# Patient Record
Sex: MALE | Race: WHITE | NOT HISPANIC OR LATINO | Employment: OTHER | URBAN - METROPOLITAN AREA
[De-identification: names, ages, dates, MRNs, and addresses within clinical notes are randomized per-mention and may not be internally consistent; named-entity substitution may affect disease eponyms.]

---

## 2023-07-24 ENCOUNTER — NURSING HOME VISIT (OUTPATIENT)
Dept: POST ACUTE CARE | Facility: EXTERNAL LOCATION | Age: 78
End: 2023-07-24
Payer: MEDICARE

## 2023-07-24 DIAGNOSIS — R53.1 WEAKNESS: Primary | ICD-10-CM

## 2023-07-24 DIAGNOSIS — I10 PRIMARY HYPERTENSION: ICD-10-CM

## 2023-07-24 PROCEDURE — 99304 1ST NF CARE SF/LOW MDM 25: CPT | Performed by: INTERNAL MEDICINE

## 2023-07-24 NOTE — LETTER
Patient: Donn Hough  : 1945    Encounter Date: 2023    Pt was seen in the NH, 77 yo male with PMH   General appearance: Comfortable, no distress  ROS: No SOB  Medications reviewed  Head: Normal  Neck: Soft  Heart: Regular  Lungs: Clear  Abdomen: soft   Back surgical area healing nicely      Impression: Rehab supportive care, continue current management    Problem List Items Addressed This Visit    None  Visit Diagnoses       Weakness    -  Primary    Primary hypertension                   Electronically Signed By: Romulo Pulido MD   23  9:15 PM

## 2023-07-25 NOTE — PROGRESS NOTES
Pt was seen in the NH, 79 yo male with PMH   General appearance: Comfortable, no distress  ROS: No SOB  Medications reviewed  Head: Normal  Neck: Soft  Heart: Regular  Lungs: Clear  Abdomen: soft   Back surgical area healing nicely      Impression: Rehab supportive care, continue current management    Problem List Items Addressed This Visit    None  Visit Diagnoses       Weakness    -  Primary    Primary hypertension

## 2023-08-03 ENCOUNTER — OFFICE VISIT (OUTPATIENT)
Dept: PRIMARY CARE | Facility: CLINIC | Age: 78
End: 2023-08-03
Payer: MEDICARE

## 2023-08-03 VITALS — SYSTOLIC BLOOD PRESSURE: 132 MMHG | HEART RATE: 92 BPM | DIASTOLIC BLOOD PRESSURE: 74 MMHG

## 2023-08-03 DIAGNOSIS — M17.12 PRIMARY OSTEOARTHRITIS OF LEFT KNEE: Primary | ICD-10-CM

## 2023-08-03 PROBLEM — M19.90 ARTHRITIS: Status: ACTIVE | Noted: 2019-05-02

## 2023-08-03 PROBLEM — M10.00 ACUTE IDIOPATHIC GOUT: Status: ACTIVE | Noted: 2023-08-03

## 2023-08-03 PROBLEM — N40.1 BENIGN PROSTATIC HYPERPLASIA WITH URINARY RETENTION: Status: ACTIVE | Noted: 2019-05-02

## 2023-08-03 PROBLEM — N18.30 CONTROLLED TYPE 2 DIABETES MELLITUS WITH STAGE 3 CHRONIC KIDNEY DISEASE, WITHOUT LONG-TERM CURRENT USE OF INSULIN (MULTI): Status: ACTIVE | Noted: 2023-08-03

## 2023-08-03 PROBLEM — G62.9 NEUROPATHY: Status: ACTIVE | Noted: 2023-08-03

## 2023-08-03 PROBLEM — R20.0 NUMBNESS AND TINGLING IN RIGHT HAND: Status: ACTIVE | Noted: 2018-11-16

## 2023-08-03 PROBLEM — M51.36 DDD (DEGENERATIVE DISC DISEASE), LUMBAR: Status: ACTIVE | Noted: 2023-07-07

## 2023-08-03 PROBLEM — R07.9 CHEST PAIN: Status: RESOLVED | Noted: 2019-11-06 | Resolved: 2023-08-03

## 2023-08-03 PROBLEM — E66.9 OBESITY (BMI 30.0-34.9): Status: ACTIVE | Noted: 2019-05-02

## 2023-08-03 PROBLEM — I10 HTN (HYPERTENSION), BENIGN: Status: ACTIVE | Noted: 2019-04-21

## 2023-08-03 PROBLEM — D64.9 ANEMIA: Status: ACTIVE | Noted: 2022-08-19

## 2023-08-03 PROBLEM — Z98.890 POST-OPERATIVE STATE: Status: RESOLVED | Noted: 2023-07-06 | Resolved: 2023-08-03

## 2023-08-03 PROBLEM — Z95.1 HX OF CABG: Status: ACTIVE | Noted: 2021-07-28

## 2023-08-03 PROBLEM — E03.9 ACQUIRED HYPOTHYROIDISM: Status: ACTIVE | Noted: 2019-05-02

## 2023-08-03 PROBLEM — A08.4 VIRAL DIARRHEA: Status: RESOLVED | Noted: 2023-06-19 | Resolved: 2023-08-03

## 2023-08-03 PROBLEM — M79.662 PAIN OF LEFT CALF: Status: ACTIVE | Noted: 2023-07-10

## 2023-08-03 PROBLEM — M54.50 LOW BACK PAIN: Status: RESOLVED | Noted: 2022-04-27 | Resolved: 2023-08-03

## 2023-08-03 PROBLEM — M43.26 FUSION OF LUMBAR SPINE: Status: ACTIVE | Noted: 2023-07-09

## 2023-08-03 PROBLEM — M25.569 PAIN IN JOINT, LOWER LEG: Status: ACTIVE | Noted: 2023-06-21

## 2023-08-03 PROBLEM — M50.90 CERVICAL DISC DISORDER: Status: ACTIVE | Noted: 2019-05-02

## 2023-08-03 PROBLEM — N17.9 ARF (ACUTE RENAL FAILURE) (CMS-HCC): Status: ACTIVE | Noted: 2023-05-06

## 2023-08-03 PROBLEM — M54.6 THORACIC SPINE PAIN: Status: RESOLVED | Noted: 2022-04-27 | Resolved: 2023-08-03

## 2023-08-03 PROBLEM — R55 NEAR SYNCOPE: Status: RESOLVED | Noted: 2023-02-01 | Resolved: 2023-08-03

## 2023-08-03 PROBLEM — G47.33 OBSTRUCTIVE SLEEP APNEA SYNDROME: Chronic | Status: ACTIVE | Noted: 2019-05-02

## 2023-08-03 PROBLEM — R26.89 POOR BALANCE: Status: ACTIVE | Noted: 2022-04-27

## 2023-08-03 PROBLEM — Z98.890 H/O CERVICAL SPINE SURGERY: Status: RESOLVED | Noted: 2018-11-16 | Resolved: 2023-08-03

## 2023-08-03 PROBLEM — E78.5 HYPERLIPIDEMIA: Chronic | Status: ACTIVE | Noted: 2023-06-19

## 2023-08-03 PROBLEM — I25.10 CORONARY ARTERY DISEASE INVOLVING NATIVE CORONARY ARTERY OF NATIVE HEART WITHOUT ANGINA PECTORIS: Status: ACTIVE | Noted: 2019-04-21

## 2023-08-03 PROBLEM — E11.22 CONTROLLED TYPE 2 DIABETES MELLITUS WITH STAGE 3 CHRONIC KIDNEY DISEASE, WITHOUT LONG-TERM CURRENT USE OF INSULIN (MULTI): Status: ACTIVE | Noted: 2023-08-03

## 2023-08-03 PROBLEM — R19.7 DIARRHEA: Status: RESOLVED | Noted: 2023-06-19 | Resolved: 2023-08-03

## 2023-08-03 PROBLEM — Z96.652 S/P TOTAL KNEE ARTHROPLASTY, LEFT: Status: RESOLVED | Noted: 2023-07-11 | Resolved: 2023-08-03

## 2023-08-03 PROBLEM — R20.2 NUMBNESS AND TINGLING IN RIGHT HAND: Status: ACTIVE | Noted: 2018-11-16

## 2023-08-03 PROBLEM — R33.8 BENIGN PROSTATIC HYPERPLASIA WITH URINARY RETENTION: Status: ACTIVE | Noted: 2019-05-02

## 2023-08-03 PROBLEM — E78.2 MIXED HYPERLIPIDEMIA: Status: ACTIVE | Noted: 2023-08-03

## 2023-08-03 PROBLEM — D72.829 LEUKOCYTOSIS: Status: ACTIVE | Noted: 2023-06-19

## 2023-08-03 PROBLEM — F32.A ANXIETY AND DEPRESSION: Status: ACTIVE | Noted: 2019-05-02

## 2023-08-03 PROBLEM — R53.1 GENERALIZED WEAKNESS: Status: ACTIVE | Noted: 2019-05-02

## 2023-08-03 PROBLEM — M47.816 SPONDYLOSIS OF LUMBAR SPINE: Status: ACTIVE | Noted: 2023-05-07

## 2023-08-03 PROBLEM — F41.9 ANXIETY AND DEPRESSION: Status: ACTIVE | Noted: 2019-05-02

## 2023-08-03 PROBLEM — U07.1 COVID-19: Status: RESOLVED | Noted: 2021-11-23 | Resolved: 2023-08-03

## 2023-08-03 PROCEDURE — 1160F RVW MEDS BY RX/DR IN RCRD: CPT | Performed by: INTERNAL MEDICINE

## 2023-08-03 PROCEDURE — 3075F SYST BP GE 130 - 139MM HG: CPT | Performed by: INTERNAL MEDICINE

## 2023-08-03 PROCEDURE — 1036F TOBACCO NON-USER: CPT | Performed by: INTERNAL MEDICINE

## 2023-08-03 PROCEDURE — 99213 OFFICE O/P EST LOW 20 MIN: CPT | Performed by: INTERNAL MEDICINE

## 2023-08-03 PROCEDURE — 3078F DIAST BP <80 MM HG: CPT | Performed by: INTERNAL MEDICINE

## 2023-08-03 PROCEDURE — 1159F MED LIST DOCD IN RCRD: CPT | Performed by: INTERNAL MEDICINE

## 2023-08-03 PROCEDURE — 20610 DRAIN/INJ JOINT/BURSA W/O US: CPT | Performed by: INTERNAL MEDICINE

## 2023-08-03 RX ORDER — WARFARIN SODIUM 5 MG/1
TABLET ORAL
COMMUNITY

## 2023-08-03 RX ORDER — LOSARTAN POTASSIUM 25 MG/1
25 TABLET ORAL
COMMUNITY
Start: 2022-08-29

## 2023-08-03 RX ORDER — CALCIUM CITRATE/VITAMIN D3 200MG-6.25
TABLET ORAL 2 TIMES DAILY
COMMUNITY
Start: 2023-04-26

## 2023-08-03 RX ORDER — METHOCARBAMOL 500 MG/1
500 TABLET, FILM COATED ORAL 4 TIMES DAILY PRN
COMMUNITY
Start: 2023-07-22

## 2023-08-03 RX ORDER — DULOXETIN HYDROCHLORIDE 60 MG/1
60 CAPSULE, DELAYED RELEASE ORAL
COMMUNITY
Start: 2019-05-01

## 2023-08-03 RX ORDER — POLYETHYLENE GLYCOL 3350 17 G/17G
17 POWDER, FOR SOLUTION ORAL
COMMUNITY
Start: 2023-07-23

## 2023-08-03 RX ORDER — ATORVASTATIN CALCIUM 20 MG/1
20 TABLET, FILM COATED ORAL
COMMUNITY
Start: 2022-12-16

## 2023-08-03 RX ORDER — FINASTERIDE 5 MG/1
5 TABLET, FILM COATED ORAL
COMMUNITY
Start: 2023-06-09

## 2023-08-03 RX ORDER — NALOXONE HYDROCHLORIDE 4 MG/.1ML
SPRAY NASAL
COMMUNITY
Start: 2023-07-22

## 2023-08-03 RX ORDER — INSULIN DETEMIR 100 [IU]/ML
16 INJECTION, SOLUTION SUBCUTANEOUS
COMMUNITY
Start: 2023-07-22

## 2023-08-03 RX ORDER — POTASSIUM CHLORIDE 750 MG/1
10 TABLET, FILM COATED, EXTENDED RELEASE ORAL
COMMUNITY

## 2023-08-03 RX ORDER — METFORMIN HYDROCHLORIDE 500 MG/1
500 TABLET, EXTENDED RELEASE ORAL 2 TIMES DAILY
COMMUNITY
Start: 2021-03-02

## 2023-08-03 RX ORDER — LORAZEPAM 0.5 MG/1
TABLET ORAL
COMMUNITY
Start: 2023-07-24

## 2023-08-03 RX ORDER — ALLOPURINOL 300 MG/1
TABLET ORAL DAILY
COMMUNITY

## 2023-08-03 RX ORDER — LEUCOVORIN/PYRIDOX/MECOBALAMIN 4-50-2 MG
1 TABLET ORAL DAILY
COMMUNITY

## 2023-08-03 RX ORDER — ACETAMINOPHEN 325 MG/1
650 TABLET ORAL EVERY 6 HOURS PRN
COMMUNITY
Start: 2019-05-01

## 2023-08-03 RX ORDER — ASPIRIN 81 MG/1
81 TABLET ORAL
COMMUNITY
Start: 2019-05-01

## 2023-08-03 RX ORDER — MORPHINE SULFATE 15 MG/1
15 TABLET, FILM COATED, EXTENDED RELEASE ORAL
COMMUNITY

## 2023-08-03 RX ORDER — CHOLECALCIFEROL (VITAMIN D3) 25 MCG
1000 TABLET ORAL
COMMUNITY

## 2023-08-03 RX ORDER — CLOPIDOGREL BISULFATE 75 MG/1
75 TABLET ORAL
COMMUNITY
Start: 2022-11-16

## 2023-08-03 RX ORDER — METOPROLOL SUCCINATE 25 MG/1
TABLET, EXTENDED RELEASE ORAL
COMMUNITY
Start: 2019-01-22

## 2023-08-03 RX ORDER — TAMSULOSIN HYDROCHLORIDE 0.4 MG/1
1 CAPSULE ORAL DAILY
COMMUNITY
Start: 2021-02-10

## 2023-08-03 RX ORDER — LEVOTHYROXINE SODIUM 75 UG/1
1 TABLET ORAL DAILY
COMMUNITY
Start: 2021-03-02

## 2023-08-03 RX ORDER — DULOXETIN HYDROCHLORIDE 30 MG/1
CAPSULE, DELAYED RELEASE ORAL
COMMUNITY
Start: 2023-07-26 | End: 2023-08-03 | Stop reason: ALTCHOICE

## 2023-08-03 ASSESSMENT — ENCOUNTER SYMPTOMS
VOMITING: 0
DYSURIA: 0
LIGHT-HEADEDNESS: 0
HEMATOLOGIC/LYMPHATIC NEGATIVE: 1
WHEEZING: 0
RESPIRATORY NEGATIVE: 1
DIARRHEA: 0
CONSTITUTIONAL NEGATIVE: 1
EYE DISCHARGE: 0
JOINT SWELLING: 0
ENDOCRINE NEGATIVE: 1
ADENOPATHY: 0
CONFUSION: 0
ABDOMINAL PAIN: 0
NUMBNESS: 0
ALLERGIC/IMMUNOLOGIC NEGATIVE: 1
NAUSEA: 0
FEVER: 0
CARDIOVASCULAR NEGATIVE: 1
PALPITATIONS: 0
HEADACHES: 0
ABDOMINAL DISTENTION: 0
AGITATION: 0
NEUROLOGICAL NEGATIVE: 1
SHORTNESS OF BREATH: 0
CHILLS: 0
ARTHRALGIAS: 1
PSYCHIATRIC NEGATIVE: 1
CONSTIPATION: 0
COUGH: 0
GASTROINTESTINAL NEGATIVE: 1
BACK PAIN: 0
NECK STIFFNESS: 0
EYES NEGATIVE: 1

## 2023-08-03 ASSESSMENT — PATIENT HEALTH QUESTIONNAIRE - PHQ9
2. FEELING DOWN, DEPRESSED OR HOPELESS: NOT AT ALL
SUM OF ALL RESPONSES TO PHQ9 QUESTIONS 1 AND 2: 0
1. LITTLE INTEREST OR PLEASURE IN DOING THINGS: NOT AT ALL

## 2023-08-03 NOTE — PROGRESS NOTES
Subjective   Patient ID: Donn Hough is a 78 y.o. male who presents for Follow-up (CORTISONE INJECTION LEFT KNEE).  HERE FOR KNEE INJECTION FOR KNEE PAIN        Review of Systems   Constitutional: Negative.  Negative for chills and fever.   HENT: Negative.  Negative for congestion.    Eyes: Negative.  Negative for discharge.   Respiratory: Negative.  Negative for cough, shortness of breath and wheezing.    Cardiovascular: Negative.  Negative for chest pain, palpitations and leg swelling.   Gastrointestinal: Negative.  Negative for abdominal distention, abdominal pain, constipation, diarrhea, nausea and vomiting.   Endocrine: Negative.    Genitourinary: Negative.  Negative for dysuria and urgency.   Musculoskeletal:  Positive for arthralgias. Negative for back pain, joint swelling and neck stiffness.   Skin: Negative.  Negative for rash.   Allergic/Immunologic: Negative.  Negative for immunocompromised state.   Neurological: Negative.  Negative for light-headedness, numbness and headaches.   Hematological: Negative.  Negative for adenopathy.   Psychiatric/Behavioral: Negative.  Negative for agitation, behavioral problems and confusion.    All other systems reviewed and are negative.      Objective   Physical Exam  Vitals reviewed.   Constitutional:       General: He is not in acute distress.     Appearance: Normal appearance.   HENT:      Head: Normocephalic and atraumatic.      Nose: Nose normal.   Eyes:      Conjunctiva/sclera: Conjunctivae normal.      Pupils: Pupils are equal, round, and reactive to light.   Neck:      Vascular: No carotid bruit.   Cardiovascular:      Rate and Rhythm: Normal rate and regular rhythm.      Pulses: Normal pulses.      Heart sounds:      No gallop.   Pulmonary:      Effort: Pulmonary effort is normal. No respiratory distress.      Breath sounds: Normal breath sounds. No wheezing.   Abdominal:      General: Bowel sounds are normal.      Palpations: Abdomen is soft.       Tenderness: There is no abdominal tenderness.   Musculoskeletal:         General: Normal range of motion.      Cervical back: Normal range of motion. No rigidity.   Lymphadenopathy:      Cervical: No cervical adenopathy.   Skin:     General: Skin is warm.      Findings: No rash.   Neurological:      General: No focal deficit present.      Mental Status: He is alert and oriented to person, place, and time.   Psychiatric:         Mood and Affect: Mood normal.         Behavior: Behavior normal.       /74   Pulse 92    Hemoglobin A1C   Date/Time Value Ref Range Status   07/25/2023 05:49 AM 8.0 (H) 4.0 - 5.6 % Final     Assessment/Plan   Problem List Items Addressed This Visit    None  Visit Diagnoses       Primary osteoarthritis of left knee    -  Primary    Relevant Medications    triamcinolone acetonide (Kenalog) injection 40 mg (Start on 8/3/2023  4:30 PM)             UNDER STERILE CONDITION 40 MG KENOLOG IN 2 ML LIDOCAINE 1% INJECTED IN THE LEFT KNEE FROM MEDIAL APPROACH , PT DID FINE NO COMPLICATIONS HAPPENED.

## 2023-08-07 ENCOUNTER — NURSING HOME VISIT (OUTPATIENT)
Dept: POST ACUTE CARE | Facility: EXTERNAL LOCATION | Age: 78
End: 2023-08-07
Payer: MEDICARE

## 2023-08-07 DIAGNOSIS — R53.1 GENERALIZED WEAKNESS: Primary | ICD-10-CM

## 2023-08-07 DIAGNOSIS — E11.9 TYPE 2 DIABETES MELLITUS WITHOUT COMPLICATION, WITHOUT LONG-TERM CURRENT USE OF INSULIN (MULTI): ICD-10-CM

## 2023-08-07 DIAGNOSIS — M47.816 SPONDYLOSIS OF LUMBAR SPINE: ICD-10-CM

## 2023-08-07 DIAGNOSIS — M25.562 PAIN IN JOINT OF LEFT KNEE: ICD-10-CM

## 2023-08-07 PROCEDURE — 99309 SBSQ NF CARE MODERATE MDM 30: CPT | Performed by: NURSE PRACTITIONER

## 2023-08-07 NOTE — LETTER
Patient: Donn Hough  : 1945    Encounter Date: 2023    Subjective  Patient ID: Donn Hough is a 78 y.o. male who presents for No chief complaint on file..  79 [facility] at [facility] for [facility] for weakness and fall at home.  History of DDD, anemia, hypothyroidism, DM, CKD, BPH, JOSIAS, depression and anxiety.  Resident in room.  States he has not been able to sleep due to the pain in his knee.  States the pain is worse at night, rates his pain 5-6 currently. States his lower back is improved with his surgical intervention.         Review of Systems   Constitutional:  Negative for fatigue.   Respiratory:  Negative for cough and shortness of breath.    Cardiovascular:  Negative for chest pain, palpitations and leg swelling.   Gastrointestinal:  Negative for abdominal distention, abdominal pain, constipation, diarrhea and nausea.   Musculoskeletal:  Positive for gait problem. Negative for back pain.   Skin: Negative.    Neurological:  Positive for weakness. Negative for dizziness and numbness.   Psychiatric/Behavioral:  The patient is nervous/anxious.        Objective  Physical Exam  Constitutional:       General: He is not in acute distress.  Cardiovascular:      Rate and Rhythm: Normal rate and regular rhythm.   Pulmonary:      Effort: Pulmonary effort is normal.      Breath sounds: Normal breath sounds.   Abdominal:      General: Bowel sounds are normal. There is no distension.      Tenderness: There is no abdominal tenderness.   Musculoskeletal:      Right lower leg: No edema.      Left lower leg: No edema.      Comments: Resident reports he has pain at night and meds are not working.    Skin:     General: Skin is warm and dry.   Neurological:      Mental Status: He is alert and oriented to person, place, and time.      Motor: Weakness present.      Comments: Resident states he is confused regarding his knee pain.  Aware he needs knee replacement however, states he is not sure why he keeps having pain.   Unsure if coming from knee itself or from his back.             Current Outpatient Medications:   •  acetaminophen (Tylenol) 325 mg tablet, Take 2 tablets (650 mg) by mouth every 6 hours if needed., Disp: , Rfl:   •  allopurinol (Zyloprim) 300 mg tablet, Take by mouth once daily., Disp: , Rfl:   •  aspirin 81 mg EC tablet, Take 1 tablet (81 mg) by mouth once daily., Disp: , Rfl:   •  atorvastatin (Lipitor) 20 mg tablet, Take 1 tablet (20 mg) by mouth once daily., Disp: , Rfl:   •  cholecalciferol (Vitamin D-3) 25 MCG (1000 UT) tablet, Take 1 tablet (1,000 Units) by mouth once daily., Disp: , Rfl:   •  clopidogrel (Plavix) 75 mg tablet, Take 1 tablet (75 mg) by mouth once daily., Disp: , Rfl:   •  DULoxetine (Cymbalta) 60 mg DR capsule, Take 1 capsule (60 mg) by mouth once daily., Disp: , Rfl:   •  finasteride (Proscar) 5 mg tablet, Take 1 tablet (5 mg) by mouth once daily., Disp: , Rfl:   •  Folinic-Plus 4-50-2 mg tablet, Take 1 tablet by mouth once daily., Disp: , Rfl:   •  Levemir FlexPen 100 unit/mL (3 mL) pen, 16 Units once daily., Disp: , Rfl:   •  levothyroxine (Synthroid, Levoxyl) 75 mcg tablet, Take 1 tablet (75 mcg) by mouth once daily., Disp: , Rfl:   •  LORazepam (Ativan) 0.5 mg tablet, , Disp: , Rfl:   •  losartan (Cozaar) 25 mg tablet, Take 1 tablet (25 mg) by mouth once daily., Disp: , Rfl:   •  metFORMIN XR (Glucophage-XR) 500 mg 24 hr tablet, Take 1 tablet (500 mg) by mouth twice a day., Disp: , Rfl:   •  methocarbamol (Robaxin) 500 mg tablet, Take 1 tablet (500 mg) by mouth 4 times a day as needed for muscle spasms., Disp: , Rfl:   •  metoprolol succinate XL (Toprol-XL) 25 mg 24 hr tablet, Take 1/2 tablet (12.5mg) daily ., Disp: , Rfl:   •  morphine CR (MS Contin) 15 mg 12 hr tablet, Take 1 tablet (15 mg) by mouth once daily., Disp: , Rfl:   •  naloxone (Narcan) 4 mg/0.1 mL nasal spray, Administer 1 spray into one nostril for known or suspected opioid overdose. If patient worsens or does not  respond, may repeat in 2-3 minutes. ., Disp: , Rfl:   •  polyethylene glycol (Glycolax, Miralax) 17 gram/dose powder, Take 17 g by mouth once daily., Disp: , Rfl:   •  potassium chloride CR 10 mEq ER tablet, Take 1 tablet (10 mEq) by mouth., Disp: , Rfl:   •  tamsulosin (Flomax) 0.4 mg 24 hr capsule, Take 1 capsule (0.4 mg) by mouth once daily., Disp: , Rfl:   •  True Metrix Glucose Test Strip strip, 2 times a day., Disp: , Rfl:   •  warfarin (Coumadin) 5 mg tablet, warfarin 5 mg tablet, Disp: , Rfl:      Assessment/Plan  Problem List Items Addressed This Visit          Endocrine/Metabolic    Type 2 diabetes mellitus, without long-term current use of insulin (CMS/[facility])       Musculoskeletal and Injuries    Pain in joint of left knee       Neuro    Spondylosis of lumbar spine       Symptoms and Signs    Generalized weakness - Primary   Will increase his Levemir to 19 units at hs due to elevation of BS.    Will change his prn [place] to q6hrs prn and schedule his methocarbamol 500mg to at bedtime and continue prn.  Start Voltaren Gel bid to left knee.             Electronically Signed By: TY Mo-CNP   8/14/23  8:27 AM

## 2023-08-14 PROBLEM — E11.9 TYPE 2 DIABETES MELLITUS, WITHOUT LONG-TERM CURRENT USE OF INSULIN (MULTI): Status: ACTIVE | Noted: 2023-08-03

## 2023-08-14 PROBLEM — M25.562 PAIN IN JOINT OF LEFT KNEE: Status: ACTIVE | Noted: 2023-08-14

## 2023-08-14 ASSESSMENT — ENCOUNTER SYMPTOMS
DIZZINESS: 0
COUGH: 0
NUMBNESS: 0
WEAKNESS: 1
BACK PAIN: 0
PALPITATIONS: 0
NAUSEA: 0
DIARRHEA: 0
SHORTNESS OF BREATH: 0
FATIGUE: 0
NERVOUS/ANXIOUS: 1
ABDOMINAL PAIN: 0
ABDOMINAL DISTENTION: 0
CONSTIPATION: 0

## 2023-08-14 NOTE — PROGRESS NOTES
Subjective   Patient ID: Donn Hough is a 78 y.o. male who presents for No chief complaint on file..  79 yo male at [facility] for rehab for weakness and fall at home.  History of DDD, anemia, hypothyroidism, DM, CKD, BPH, JOSIAS, depression and anxiety.  Resident in room.  States he has not been able to sleep due to the pain in his knee.  States the pain is worse at night, rates his pain 5-6 currently. States his lower back is improved with his surgical intervention.         Review of Systems   Constitutional:  Negative for fatigue.   Respiratory:  Negative for cough and shortness of breath.    Cardiovascular:  Negative for chest pain, palpitations and leg swelling.   Gastrointestinal:  Negative for abdominal distention, abdominal pain, constipation, diarrhea and nausea.   Musculoskeletal:  Positive for gait problem. Negative for back pain.   Skin: Negative.    Neurological:  Positive for weakness. Negative for dizziness and numbness.   Psychiatric/Behavioral:  The patient is nervous/anxious.        Objective   Physical Exam  Constitutional:       General: He is not in acute distress.  Cardiovascular:      Rate and Rhythm: Normal rate and regular rhythm.   Pulmonary:      Effort: Pulmonary effort is normal.      Breath sounds: Normal breath sounds.   Abdominal:      General: Bowel sounds are normal. There is no distension.      Tenderness: There is no abdominal tenderness.   Musculoskeletal:      Right lower leg: No edema.      Left lower leg: No edema.      Comments: Resident reports he has pain at night and meds are not working.    Skin:     General: Skin is warm and dry.   Neurological:      Mental Status: He is alert and oriented to person, place, and time.      Motor: Weakness present.      Comments: Resident states he is confused regarding his knee pain.  Aware he needs knee replacement however, states he is not sure why he keeps having pain.  Unsure if coming from knee itself or from his back.             Current  Outpatient Medications:     acetaminophen (Tylenol) 325 mg tablet, Take 2 tablets (650 mg) by mouth every 6 hours if needed., Disp: , Rfl:     allopurinol (Zyloprim) 300 mg tablet, Take by mouth once daily., Disp: , Rfl:     aspirin 81 mg EC tablet, Take 1 tablet (81 mg) by mouth once daily., Disp: , Rfl:     atorvastatin (Lipitor) 20 mg tablet, Take 1 tablet (20 mg) by mouth once daily., Disp: , Rfl:     cholecalciferol (Vitamin D-3) 25 MCG (1000 UT) tablet, Take 1 tablet (1,000 Units) by mouth once daily., Disp: , Rfl:     clopidogrel (Plavix) 75 mg tablet, Take 1 tablet (75 mg) by mouth once daily., Disp: , Rfl:     DULoxetine (Cymbalta) 60 mg DR capsule, Take 1 capsule (60 mg) by mouth once daily., Disp: , Rfl:     finasteride (Proscar) 5 mg tablet, Take 1 tablet (5 mg) by mouth once daily., Disp: , Rfl:     Folinic-Plus 4-50-2 mg tablet, Take 1 tablet by mouth once daily., Disp: , Rfl:     Levemir FlexPen 100 unit/mL (3 mL) pen, 16 Units once daily., Disp: , Rfl:     levothyroxine (Synthroid, Levoxyl) 75 mcg tablet, Take 1 tablet (75 mcg) by mouth once daily., Disp: , Rfl:     LORazepam (Ativan) 0.5 mg tablet, , Disp: , Rfl:     losartan (Cozaar) 25 mg tablet, Take 1 tablet (25 mg) by mouth once daily., Disp: , Rfl:     metFORMIN XR (Glucophage-XR) 500 mg 24 hr tablet, Take 1 tablet (500 mg) by mouth twice a day., Disp: , Rfl:     methocarbamol (Robaxin) 500 mg tablet, Take 1 tablet (500 mg) by mouth 4 times a day as needed for muscle spasms., Disp: , Rfl:     metoprolol succinate XL (Toprol-XL) 25 mg 24 hr tablet, Take 1/2 tablet (12.5mg) daily ., Disp: , Rfl:     morphine CR (MS Contin) 15 mg 12 hr tablet, Take 1 tablet (15 mg) by mouth once daily., Disp: , Rfl:     naloxone (Narcan) 4 mg/0.1 mL nasal spray, Administer 1 spray into one nostril for known or suspected opioid overdose. If patient worsens or does not respond, may repeat in 2-3 minutes. ., Disp: , Rfl:     polyethylene glycol (Glycolax, Miralax)  17 gram/dose powder, Take 17 g by mouth once daily., Disp: , Rfl:     potassium chloride CR 10 mEq ER tablet, Take 1 tablet (10 mEq) by mouth., Disp: , Rfl:     tamsulosin (Flomax) 0.4 mg 24 hr capsule, Take 1 capsule (0.4 mg) by mouth once daily., Disp: , Rfl:     True Metrix Glucose Test Strip strip, 2 times a day., Disp: , Rfl:     warfarin (Coumadin) 5 mg tablet, warfarin 5 mg tablet, Disp: , Rfl:      Assessment/Plan   Problem List Items Addressed This Visit          Endocrine/Metabolic    Type 2 diabetes mellitus, without long-term current use of insulin (CMS/[facility])       Musculoskeletal and Injuries    Pain in joint of left knee       Neuro    Spondylosis of lumbar spine       Symptoms and Signs    Generalized weakness - Primary   Will increase his Levemir to 19 units at hs due to elevation of BS.    Will change his prn [place] to q6hrs prn and schedule his methocarbamol 500mg to at bedtime and continue prn.  Start Voltaren Gel bid to left knee.